# Patient Record
Sex: FEMALE | Race: WHITE | NOT HISPANIC OR LATINO
[De-identification: names, ages, dates, MRNs, and addresses within clinical notes are randomized per-mention and may not be internally consistent; named-entity substitution may affect disease eponyms.]

---

## 2018-02-05 ENCOUNTER — RECORD ABSTRACTING (OUTPATIENT)
Age: 58
End: 2018-02-05

## 2018-02-05 DIAGNOSIS — E03.9 HYPOTHYROIDISM, UNSPECIFIED: ICD-10-CM

## 2018-02-05 DIAGNOSIS — Z87.19 PERSONAL HISTORY OF OTHER DISEASES OF THE DIGESTIVE SYSTEM: ICD-10-CM

## 2018-02-05 DIAGNOSIS — Z82.49 FAMILY HISTORY OF ISCHEMIC HEART DISEASE AND OTHER DISEASES OF THE CIRCULATORY SYSTEM: ICD-10-CM

## 2018-02-05 DIAGNOSIS — R93.1 ABNORMAL FINDINGS ON DIAGNOSTIC IMAGING OF HEART AND CORONARY CIRCULATION: ICD-10-CM

## 2018-02-05 DIAGNOSIS — Z78.9 OTHER SPECIFIED HEALTH STATUS: ICD-10-CM

## 2018-02-05 DIAGNOSIS — Z86.79 PERSONAL HISTORY OF OTHER DISEASES OF THE CIRCULATORY SYSTEM: ICD-10-CM

## 2018-02-05 DIAGNOSIS — Z86.19 PERSONAL HISTORY OF OTHER INFECTIOUS AND PARASITIC DISEASES: ICD-10-CM

## 2018-03-08 ENCOUNTER — APPOINTMENT (OUTPATIENT)
Dept: CARDIOLOGY | Facility: CLINIC | Age: 58
End: 2018-03-08

## 2018-04-25 RX ORDER — BACILLUS COAGULANS/INULIN 1B-250 MG
CAPSULE ORAL
Refills: 0 | Status: ACTIVE | COMMUNITY

## 2018-04-25 RX ORDER — LEVOTHYROXINE SODIUM 137 UG/1
TABLET ORAL
Refills: 0 | Status: DISCONTINUED | COMMUNITY
End: 2018-04-25

## 2018-04-27 ENCOUNTER — APPOINTMENT (OUTPATIENT)
Dept: CARDIOLOGY | Facility: CLINIC | Age: 58
End: 2018-04-27
Payer: COMMERCIAL

## 2018-04-27 VITALS
RESPIRATION RATE: 16 BRPM | HEART RATE: 79 BPM | HEIGHT: 64 IN | DIASTOLIC BLOOD PRESSURE: 80 MMHG | SYSTOLIC BLOOD PRESSURE: 135 MMHG

## 2018-04-27 PROCEDURE — 99214 OFFICE O/P EST MOD 30 MIN: CPT

## 2018-04-27 PROCEDURE — 93000 ELECTROCARDIOGRAM COMPLETE: CPT

## 2018-04-27 RX ORDER — PITAVASTATIN CALCIUM 4.18 MG/1
4 TABLET, FILM COATED ORAL
Refills: 0 | Status: DISCONTINUED | COMMUNITY
End: 2018-04-27

## 2018-04-27 RX ORDER — UBIDECARENONE 50 MG
CAPSULE ORAL
Refills: 0 | Status: DISCONTINUED | COMMUNITY
End: 2018-04-27

## 2018-04-27 RX ORDER — PITAVASTATIN CALCIUM 2.09 MG/1
2 TABLET, FILM COATED ORAL
Qty: 90 | Refills: 0 | Status: DISCONTINUED | COMMUNITY
End: 2018-04-27

## 2018-05-15 ENCOUNTER — RX RENEWAL (OUTPATIENT)
Age: 58
End: 2018-05-15

## 2018-06-08 ENCOUNTER — APPOINTMENT (OUTPATIENT)
Dept: CARDIOLOGY | Facility: CLINIC | Age: 58
End: 2018-06-08
Payer: COMMERCIAL

## 2018-06-08 PROCEDURE — 93880 EXTRACRANIAL BILAT STUDY: CPT

## 2018-06-08 PROCEDURE — 93306 TTE W/DOPPLER COMPLETE: CPT

## 2018-06-15 ENCOUNTER — APPOINTMENT (OUTPATIENT)
Dept: OBGYN | Facility: CLINIC | Age: 58
End: 2018-06-15

## 2018-07-27 ENCOUNTER — APPOINTMENT (OUTPATIENT)
Dept: CARDIOLOGY | Facility: CLINIC | Age: 58
End: 2018-07-27
Payer: COMMERCIAL

## 2018-07-27 PROCEDURE — 93015 CV STRESS TEST SUPVJ I&R: CPT

## 2018-07-27 PROCEDURE — 78452 HT MUSCLE IMAGE SPECT MULT: CPT

## 2018-07-27 PROCEDURE — A9500: CPT

## 2018-08-03 RX ORDER — KIT FOR THE PREPARATION OF TECHNETIUM TC99M SESTAMIBI 1 MG/5ML
INJECTION, POWDER, LYOPHILIZED, FOR SOLUTION PARENTERAL
Refills: 0 | Status: COMPLETED | OUTPATIENT
Start: 2018-08-03

## 2018-08-03 RX ADMIN — KIT FOR THE PREPARATION OF TECHNETIUM TC99M SESTAMIBI 0: 1 INJECTION, POWDER, LYOPHILIZED, FOR SOLUTION PARENTERAL at 00:00

## 2018-08-09 RX ORDER — ALPRAZOLAM 0.5 MG/1
0.5 TABLET ORAL
Qty: 60 | Refills: 0 | Status: ACTIVE | COMMUNITY
Start: 2018-02-08

## 2018-08-16 ENCOUNTER — APPOINTMENT (OUTPATIENT)
Dept: CARDIOLOGY | Facility: CLINIC | Age: 58
End: 2018-08-16
Payer: COMMERCIAL

## 2018-08-16 VITALS
HEART RATE: 80 BPM | DIASTOLIC BLOOD PRESSURE: 85 MMHG | HEIGHT: 64 IN | RESPIRATION RATE: 16 BRPM | SYSTOLIC BLOOD PRESSURE: 120 MMHG

## 2018-08-16 PROCEDURE — 99214 OFFICE O/P EST MOD 30 MIN: CPT

## 2018-08-16 PROCEDURE — 93000 ELECTROCARDIOGRAM COMPLETE: CPT

## 2018-08-16 RX ORDER — CALCIUM CARBONATE/VITAMIN D3 600 MG-10
TABLET ORAL
Refills: 0 | Status: DISCONTINUED | COMMUNITY
End: 2018-08-16

## 2018-10-11 ENCOUNTER — MEDICATION RENEWAL (OUTPATIENT)
Age: 58
End: 2018-10-11

## 2018-11-30 ENCOUNTER — APPOINTMENT (OUTPATIENT)
Dept: OBGYN | Facility: CLINIC | Age: 58
End: 2018-11-30
Payer: COMMERCIAL

## 2018-11-30 VITALS — HEIGHT: 64 IN | DIASTOLIC BLOOD PRESSURE: 68 MMHG | HEART RATE: 82 BPM | SYSTOLIC BLOOD PRESSURE: 126 MMHG

## 2018-11-30 LAB
BILIRUB UR QL STRIP: NEGATIVE
CLARITY UR: CLEAR
COLLECTION METHOD: NORMAL
GLUCOSE UR-MCNC: NEGATIVE
HCG UR QL: 0.2 EU/DL
HGB UR QL STRIP.AUTO: NEGATIVE
KETONES UR-MCNC: NEGATIVE
LEUKOCYTE ESTERASE UR QL STRIP: NEGATIVE
NITRITE UR QL STRIP: NEGATIVE
PH UR STRIP: 7
PROT UR STRIP-MCNC: NEGATIVE
SP GR UR STRIP: 1.01

## 2018-11-30 PROCEDURE — 99386 PREV VISIT NEW AGE 40-64: CPT

## 2018-11-30 PROCEDURE — 81002 URINALYSIS NONAUTO W/O SCOPE: CPT

## 2018-11-30 RX ORDER — TRIAMCINOLONE ACETONIDE 1 MG/G
0.1 CREAM TOPICAL
Qty: 80 | Refills: 0 | Status: DISCONTINUED | COMMUNITY
Start: 2018-03-15 | End: 2018-11-30

## 2018-12-03 LAB — HPV HIGH+LOW RISK DNA PNL CVX: NOT DETECTED

## 2018-12-06 LAB — CYTOLOGY CVX/VAG DOC THIN PREP: NORMAL

## 2018-12-14 ENCOUNTER — APPOINTMENT (OUTPATIENT)
Dept: PULMONOLOGY | Facility: CLINIC | Age: 58
End: 2018-12-14

## 2019-01-21 ENCOUNTER — CLINICAL ADVICE (OUTPATIENT)
Age: 59
End: 2019-01-21

## 2019-05-03 ENCOUNTER — NON-APPOINTMENT (OUTPATIENT)
Age: 59
End: 2019-05-03

## 2019-05-03 ENCOUNTER — APPOINTMENT (OUTPATIENT)
Dept: CARDIOLOGY | Facility: CLINIC | Age: 59
End: 2019-05-03
Payer: COMMERCIAL

## 2019-05-03 VITALS
RESPIRATION RATE: 16 BRPM | BODY MASS INDEX: 21.34 KG/M2 | HEART RATE: 84 BPM | DIASTOLIC BLOOD PRESSURE: 60 MMHG | HEIGHT: 64 IN | SYSTOLIC BLOOD PRESSURE: 118 MMHG | WEIGHT: 125 LBS

## 2019-05-03 PROCEDURE — 93000 ELECTROCARDIOGRAM COMPLETE: CPT

## 2019-05-03 PROCEDURE — 99214 OFFICE O/P EST MOD 30 MIN: CPT

## 2019-05-03 NOTE — PHYSICAL EXAM
[FreeTextEntry1] :                    Well appearing and nourished with no obvious deformities or distress.\par \par Eyes: \par No conjunctival injection and no xanthelasmas.\par HEENT: \par Normocephalic.Normal oral mucosa. No pallor or cyanosis\par Neck: \par No jugular venous distension. with normal A and V wave forms. No palpable adenopathy.\par Cardiovascular: \par Normal rate and rhythm with normal S1, S2 and a grade 1/6 systolic murmur. Distal arterial pulses are normal. No significant peripheral edema.\par Pulmonary: \par Lungs are clear to auscultation and percussion. Normal respiratory pattern without any accessory muscle use\par Abdomen: \par Soft, non-tender ; no palpable organomegaly or masses.\par Extremities:\par No digital clubbing, cyanosis or ischemic changes.\par Skin: \par No skin lesions, rashes, ulcers or xanthomas.\par Psychiatric: \par Alert and oriented to person, place and time. Appropriate mood but anxious affect.

## 2019-05-03 NOTE — REASON FOR VISIT
[FreeTextEntry1] : Patient returns here for followup with regard to management of problems which include:\par \par 1. History of atherosclerotic coronary disease\par \par 2. Hyperlipidemia\par \par 3. Fatigability and muscle pain presumptively from chronic lyme disease.\par \par 4. Very strong family history of premature CAD\par \par 5. Chronic pericardial effusion\par

## 2019-05-03 NOTE — REVIEW OF SYSTEMS
[Headache] : headache [Feeling Fatigued] : feeling fatigued [Recent Weight Loss (___ Lbs)] : no recent weight loss [Recent Weight Gain (___ Lbs)] : no recent weight gain [Heartburn] : heartburn [Abdominal Pain] : abdominal pain [Joint Pain] : joint pain [Joint Stiffness] : joint stiffness [Negative] : Endocrine

## 2019-05-03 NOTE — ASSESSMENT
[FreeTextEntry1] : ECG normal sinus rhythm at 80. He T wave abnormalities anteriorly but unchanged from prior.\par \par                                                                                              ( On Simvastatin 40 5 x week)\par                              January 12, 2018                     8/3/18       4/12/19\par Total cholesterol             152                                     173         154\par HDL                                 56                                        89           59\par LDL                                 72   on Livalo  4 mg             61            78\par  daily (down from 106 on Livalo 2 mg daily)\par \par .\par \par Echocardiogram 6/8/18:\par Normal LV size and function. LVEF 60%.\par Mild mitral valve polyps.\par Trivial pericardial effusion.\par Unchanged from prior study December 2016.\par \par 6/8/18:\par No significant atherosclerotic disease seen bilaterally.\par \par Impression:\par 1. Coronary artery disease as outlined above on the CTA. involving prox LAD\par 2. Stress testing  7/27/18 showing no ischemia and preserved functional capacity\par 3. Marked improvement in lipids with Livalo however, on Simvastatin she is over goal , but taking  5 x week\par 4. No sig carotid disease\par 5. Trivial chronic pericardial effusion\par \par \par Plan:\par 1. Will adjust simvastatin 40 mg daily\par 2. We'll obtain blood work in 3 months.\par 3. Repeat echo in July\par 4. Repeat nuclear stress in august\par F/U 6 months

## 2019-08-01 ENCOUNTER — APPOINTMENT (OUTPATIENT)
Dept: CARDIOLOGY | Facility: CLINIC | Age: 59
End: 2019-08-01
Payer: COMMERCIAL

## 2019-08-01 PROCEDURE — 93306 TTE W/DOPPLER COMPLETE: CPT

## 2019-08-23 ENCOUNTER — APPOINTMENT (OUTPATIENT)
Dept: CARDIOLOGY | Facility: CLINIC | Age: 59
End: 2019-08-23
Payer: COMMERCIAL

## 2019-08-23 PROCEDURE — 78452 HT MUSCLE IMAGE SPECT MULT: CPT

## 2019-08-23 PROCEDURE — 93015 CV STRESS TEST SUPVJ I&R: CPT

## 2019-08-23 PROCEDURE — A9500: CPT

## 2019-09-05 RX ORDER — KIT FOR THE PREPARATION OF TECHNETIUM TC99M SESTAMIBI 1 MG/5ML
INJECTION, POWDER, LYOPHILIZED, FOR SOLUTION PARENTERAL
Refills: 0 | Status: COMPLETED | OUTPATIENT
Start: 2019-09-05

## 2019-09-05 RX ADMIN — KIT FOR THE PREPARATION OF TECHNETIUM TC99M SESTAMIBI 0: 1 INJECTION, POWDER, LYOPHILIZED, FOR SOLUTION PARENTERAL at 00:00

## 2019-09-16 ENCOUNTER — RX RENEWAL (OUTPATIENT)
Age: 59
End: 2019-09-16

## 2019-11-05 ENCOUNTER — RX RENEWAL (OUTPATIENT)
Age: 59
End: 2019-11-05

## 2019-12-10 ENCOUNTER — MEDICATION RENEWAL (OUTPATIENT)
Age: 59
End: 2019-12-10

## 2019-12-19 ENCOUNTER — APPOINTMENT (OUTPATIENT)
Dept: GASTROENTEROLOGY | Facility: CLINIC | Age: 59
End: 2019-12-19

## 2020-05-28 ENCOUNTER — TRANSCRIPTION ENCOUNTER (OUTPATIENT)
Age: 60
End: 2020-05-28

## 2020-07-06 ENCOUNTER — APPOINTMENT (OUTPATIENT)
Dept: CARDIOLOGY | Facility: CLINIC | Age: 60
End: 2020-07-06

## 2020-08-24 ENCOUNTER — APPOINTMENT (OUTPATIENT)
Dept: ANTEPARTUM | Facility: CLINIC | Age: 60
End: 2020-08-24

## 2020-08-24 ENCOUNTER — APPOINTMENT (OUTPATIENT)
Dept: OBGYN | Facility: CLINIC | Age: 60
End: 2020-08-24
Payer: COMMERCIAL

## 2020-08-24 VITALS
HEIGHT: 64 IN | BODY MASS INDEX: 20.66 KG/M2 | DIASTOLIC BLOOD PRESSURE: 80 MMHG | WEIGHT: 121 LBS | SYSTOLIC BLOOD PRESSURE: 130 MMHG | HEART RATE: 83 BPM

## 2020-08-24 DIAGNOSIS — Z01.419 ENCOUNTER FOR GYNECOLOGICAL EXAMINATION (GENERAL) (ROUTINE) W/OUT ABNORMAL FINDINGS: ICD-10-CM

## 2020-08-24 LAB
BLOOD URINE: NORMAL
GLUCOSE QUALITATIVE U: NORMAL
KETONES URINE: NORMAL
LEUKOCYTE ESTERASE URINE: NORMAL
NITRITE URINE: NORMAL
PROTEIN URINE: NORMAL

## 2020-08-24 PROCEDURE — 99396 PREV VISIT EST AGE 40-64: CPT

## 2020-08-24 RX ORDER — AZITHROMYCIN 500 MG/1
500 TABLET, FILM COATED ORAL
Qty: 30 | Refills: 0 | Status: COMPLETED | COMMUNITY
Start: 2020-03-27

## 2020-08-24 RX ORDER — LEVOTHYROXINE SODIUM 50 UG/1
50 TABLET ORAL
Refills: 0 | Status: DISCONTINUED | COMMUNITY
End: 2020-08-24

## 2020-08-24 RX ORDER — DOXYCYCLINE HYCLATE 100 MG/1
100 TABLET ORAL
Qty: 90 | Refills: 0 | Status: COMPLETED | COMMUNITY
Start: 2020-03-27

## 2020-08-24 RX ORDER — LEVOTHYROXINE SODIUM 75 UG/1
75 TABLET ORAL
Refills: 0 | Status: DISCONTINUED | COMMUNITY
End: 2020-08-24

## 2020-08-24 RX ORDER — NYSTATIN 500000 [USP'U]/1
500000 TABLET, FILM COATED ORAL
Qty: 60 | Refills: 0 | Status: COMPLETED | COMMUNITY
Start: 2020-02-27

## 2020-08-24 RX ORDER — ARTEMETHER AND LUMEFANTRINE 20; 120 MG/1; MG/1
20-120 TABLET ORAL
Qty: 24 | Refills: 0 | Status: COMPLETED | COMMUNITY
Start: 2020-02-27

## 2020-08-24 NOTE — HISTORY OF PRESENT ILLNESS
[___ Year(s) Ago] : [unfilled] year(s) ago [Good] : being in good health [Healthy Diet] : a healthy diet [Regular Exercise] : regular exercise [Current] : risk screening reviewed and current [Postmenopausal] : is postmenopausal [Pregnancy History] : pregnancy history: [HPV Vaccine NA Due to Age] : HPV vaccine not available to patient due to age [Currently In Menopause] : currently in menopause [Menopause Age: ____] : age at menopause was [unfilled] [Monogamous] : is monogamous [Sexually Active] : is sexually active [Male ___] : [unfilled] male [NA] : N/A [Weight Concerns] : no concerns with her weight [Menstrual Problems] : reports normal menses [Up to Date] : not up to date with ~his/her~ STD screening [Nausea] : no nausea [Fever] : no fever [Vomiting] : no vomiting [Diarrhea] : no diarrhea [Vaginal Bleeding] : no vaginal bleeding [Pelvic Pressure] : no pelvic pressure [Dysuria] : no dysuria [FreeTextEntry9] : Post menopausal. [Localized Pain] : no localized pain [Mass (___cm)] : no palpable mass [Diffused Pain] : no diffused pain [Nipple Discharge] : no nipple discharge [Skin Color Change] : no skin color change [Hot Flashes] : no hot flashes [Night Sweats] : no night sweats [Vaginal Itching] : no vaginal itching [Dyspareunia] : no dyspareunia [Mood Changes] : no mood changes [Normal Amount/Duration] : was abnormal [Spotting Between  Menses] : no spotting between menses [On BCP at conception] : the patient was not on BCP at conception [Menstrual Cramps] : no menstrual cramps [Experiencing Menopausal Sxs] : not experiencing menopausal symptoms [Contraception] : does not use contraception

## 2020-08-24 NOTE — PHYSICAL EXAM
[Awake] : awake [Alert] : alert [Well Developed] : well developed [Well Nourished] : well nourished [Normal Appearance] : was normal in appearance [Neck Supple] : was supple [Examination Of The Breasts] : a normal appearance [Breast Palpation Diffuse Fibrous Tissue Bilateral] : fibrocystic changes [No Masses] : no breast masses were palpable [Soft] : soft [Soft, Nontender] : the abdomen was soft and nontender [No Mass] : no masses were palpated [Oriented x3] : oriented to person, place, and time [No HSM] : no hepatosplenomegaly noted [Normal] : cervix [No Bleeding] : there was no active vaginal bleeding [Uterine Adnexae] : were not tender and not enlarged [Normal S1] : normal S1 [Normal S2] : normal S2 [Normal Rate] : normal [No Murmur] : no murmurs heard [No Pitting Edema] : no pitting edema present [Arterial Pulses Equal At ___ Bilat (/N Is Sub: Default = /2)] : the peripheral pulses were 2+ and symmetric [Normal Carotids] : the carotid pulses were normal with no bruits [Enlarged Diffusely] : was not enlarged [Acute Distress] : no acute distress [Mass] : no breast mass [Nipple Discharge] : no nipple discharge [Axillary LAD] : no axillary lymphadenopathy [Tender] : non tender [S3] : no S3 [S4] : no S4

## 2020-08-24 NOTE — REVIEW OF SYSTEMS
[Sight Problems] : sight problems [Nl] : Hematologic/Lymphatic [FreeTextEntry1] : Chronic Lyme disease.

## 2020-08-25 NOTE — REVIEW OF SYSTEMS
[Headache] : headache [Recent Weight Gain (___ Lbs)] : no recent weight gain [Feeling Fatigued] : feeling fatigued [Recent Weight Loss (___ Lbs)] : no recent weight loss [Abdominal Pain] : abdominal pain [Heartburn] : heartburn [Joint Pain] : joint pain [Joint Stiffness] : joint stiffness [Negative] : Heme/Lymph

## 2020-08-25 NOTE — HISTORY OF PRESENT ILLNESS
[FreeTextEntry1] : Patient admits to being very anxious about her underlying medical issues. \par Much of this relates to her history of chronic Lyme disease.\par \par She had been on Livalo which was very effective in lowering her LDL, however, \par we stopped it bec of the monthly costs and switched to Simvastatin 40 mg which we switched from QOD tp 5 x weekly last visit\par \par Cardiac CTA June 2, 2017\par Heavy calcification and plaquing of the proximal LAD 50-70%\par 10-25% diagonal and obtuse marginal branch\par 20-40% RCA\par \par 7/27/18 nuclear stress test\par Exercise 10 minutes 30 seconds mora 12 Mets).\par Peak heart rate 146 (91% maximum).\par Blood pressure response normal\par No ischemic ECG changes.\par Normal myocardial perfusion

## 2020-08-26 ENCOUNTER — APPOINTMENT (OUTPATIENT)
Dept: CARDIOLOGY | Facility: CLINIC | Age: 60
End: 2020-08-26

## 2020-08-27 LAB — HPV HIGH+LOW RISK DNA PNL CVX: NOT DETECTED

## 2020-08-31 LAB — CYTOLOGY CVX/VAG DOC THIN PREP: NORMAL

## 2020-09-09 ENCOUNTER — APPOINTMENT (OUTPATIENT)
Dept: CARDIOLOGY | Facility: CLINIC | Age: 60
End: 2020-09-09
Payer: COMMERCIAL

## 2020-09-09 VITALS
TEMPERATURE: 98 F | HEIGHT: 64 IN | WEIGHT: 119 LBS | BODY MASS INDEX: 20.32 KG/M2 | SYSTOLIC BLOOD PRESSURE: 125 MMHG | OXYGEN SATURATION: 97 % | HEART RATE: 82 BPM | RESPIRATION RATE: 16 BRPM | DIASTOLIC BLOOD PRESSURE: 80 MMHG

## 2020-09-09 PROCEDURE — 99214 OFFICE O/P EST MOD 30 MIN: CPT

## 2020-09-09 PROCEDURE — 93000 ELECTROCARDIOGRAM COMPLETE: CPT

## 2020-09-09 NOTE — REVIEW OF SYSTEMS
[Headache] : headache [Feeling Fatigued] : feeling fatigued [Heartburn] : heartburn [Abdominal Pain] : abdominal pain [Joint Stiffness] : joint stiffness [Joint Pain] : joint pain [Negative] : Heme/Lymph [Recent Weight Gain (___ Lbs)] : no recent weight gain [Recent Weight Loss (___ Lbs)] : no recent weight loss

## 2020-09-09 NOTE — ASSESSMENT
[FreeTextEntry1] :                                                                                              Sim 40 5x/wk     Daily\par                              January 12, 2018                     8/3/18       4/12/19             8/25/20\par Total cholesterol             152                                     173         154                     133\par HDL                                 56                                        89           59                       60\par LDL                                 72   on Livalo  4 mg             61            78                      46\par  daily (down from 106 on Livalo 2 mg daily)\par \par Echocardiogram 8/1/19: \par LVEF 60%.\par No valvular heart disease of significance\par Trivial pericardial effusion unchanged.\par \par Exercise sestamibi stress test 8/23/19:\par 12 minutes of exercise\par Heart rate one 5195%\par No ischemic ECG changes and normal sestamibi imaging.\par \par Echocardiogram 6/8/18:\par Normal LV size and function. LVEF 60%.\par Mild mitral valve polyps.\par Trivial pericardial effusion.\par Unchanged from prior study December 2016.\par \par 6/8/18:Carotid duplex\par No significant atherosclerotic disease seen bilaterally.\par \par Impression:\par 1. Coronary artery disease as outlined above on the CTA. involving prox LAD\par 2. Stress testing  2019  showing no ischemia at a good work load and preserved functional capacity\par 3. Marked improvement in lipids with Simvastatin daily\par 4. No sig carotid disease\par 5. Trivial chronic pericardial effusion unchanged \par \par \par Plan:\par 1. Will continue simvastatin 40 mg daily\par 2. We'll obtain blood work in 6 months.\par 3. Repeat echo this Fall\par 4. Repeat nuclear stress next summer\par F/U 6 months

## 2020-09-09 NOTE — HISTORY OF PRESENT ILLNESS
[FreeTextEntry1] : Patient admits to being very anxious about her underlying medical issues. \par Much of this relates to her history of chronic Lyme disease.\par \par She had been on Livalo which was very effective in lowering her LDL, however, \par we stopped it bec of the monthly costs and switched to Simvastatin 40 mg which we switched from QOD tp 5 x weekly \par \par Cardiac CTA June 2, 2017\par Heavy calcification and plaquing of the proximal LAD 50-70%\par 10-25% diagonal and obtuse marginal branch\par 20-40% RCA\par \par 7/27/18 nuclear stress test\par Exercise 10 minutes 30 seconds mora 12 Mets).\par Peak heart rate 146 (91% maximum).\par Blood pressure response normal\par No ischemic ECG changes.\par Normal myocardial perfusion

## 2020-09-16 ENCOUNTER — APPOINTMENT (OUTPATIENT)
Dept: CARDIOLOGY | Facility: CLINIC | Age: 60
End: 2020-09-16

## 2020-10-26 ENCOUNTER — NON-APPOINTMENT (OUTPATIENT)
Age: 60
End: 2020-10-26

## 2020-10-28 ENCOUNTER — NON-APPOINTMENT (OUTPATIENT)
Age: 60
End: 2020-10-28

## 2020-10-29 ENCOUNTER — NON-APPOINTMENT (OUTPATIENT)
Age: 60
End: 2020-10-29

## 2020-10-29 ENCOUNTER — APPOINTMENT (OUTPATIENT)
Dept: CARDIOLOGY | Facility: CLINIC | Age: 60
End: 2020-10-29
Payer: COMMERCIAL

## 2020-10-29 VITALS
DIASTOLIC BLOOD PRESSURE: 62 MMHG | RESPIRATION RATE: 15 BRPM | SYSTOLIC BLOOD PRESSURE: 124 MMHG | OXYGEN SATURATION: 96 %

## 2020-10-29 DIAGNOSIS — R42 DIZZINESS AND GIDDINESS: ICD-10-CM

## 2020-10-29 DIAGNOSIS — R10.13 EPIGASTRIC PAIN: ICD-10-CM

## 2020-10-29 DIAGNOSIS — M54.89 OTHER DORSALGIA: ICD-10-CM

## 2020-10-29 PROCEDURE — 93000 ELECTROCARDIOGRAM COMPLETE: CPT

## 2020-10-29 PROCEDURE — 99072 ADDL SUPL MATRL&STAF TM PHE: CPT

## 2020-11-17 ENCOUNTER — APPOINTMENT (OUTPATIENT)
Dept: CARDIOLOGY | Facility: CLINIC | Age: 60
End: 2020-11-17

## 2020-11-18 ENCOUNTER — APPOINTMENT (OUTPATIENT)
Dept: CARDIOLOGY | Facility: CLINIC | Age: 60
End: 2020-11-18
Payer: COMMERCIAL

## 2020-11-18 PROCEDURE — 93306 TTE W/DOPPLER COMPLETE: CPT

## 2020-11-19 ENCOUNTER — APPOINTMENT (OUTPATIENT)
Dept: CARDIOLOGY | Facility: CLINIC | Age: 60
End: 2020-11-19
Payer: COMMERCIAL

## 2020-11-19 PROCEDURE — 78452 HT MUSCLE IMAGE SPECT MULT: CPT

## 2020-11-19 PROCEDURE — 93015 CV STRESS TEST SUPVJ I&R: CPT

## 2020-11-19 PROCEDURE — A9500: CPT

## 2020-11-19 RX ADMIN — AMINOPHYLLINE 3 MG/ML: 25 INJECTION, SOLUTION INTRAVENOUS at 00:00

## 2020-11-19 RX ADMIN — REGADENOSON 5 MG/5ML: 0.08 INJECTION, SOLUTION INTRAVENOUS at 00:00

## 2020-11-24 ENCOUNTER — NON-APPOINTMENT (OUTPATIENT)
Age: 60
End: 2020-11-24

## 2020-12-01 RX ORDER — AMINOPHYLLINE 25 MG/ML
25 INJECTION, SOLUTION INTRAVENOUS
Qty: 0 | Refills: 0 | Status: COMPLETED | OUTPATIENT
Start: 2020-11-19

## 2020-12-01 RX ORDER — REGADENOSON 0.08 MG/ML
0.4 INJECTION, SOLUTION INTRAVENOUS
Qty: 4 | Refills: 0 | Status: COMPLETED | OUTPATIENT
Start: 2020-11-19

## 2020-12-02 ENCOUNTER — APPOINTMENT (OUTPATIENT)
Dept: CARDIOLOGY | Facility: CLINIC | Age: 60
End: 2020-12-02

## 2020-12-10 ENCOUNTER — OUTPATIENT (OUTPATIENT)
Dept: OUTPATIENT SERVICES | Facility: HOSPITAL | Age: 60
LOS: 1 days | End: 2020-12-10
Payer: COMMERCIAL

## 2020-12-10 DIAGNOSIS — R12 HEARTBURN: ICD-10-CM

## 2020-12-10 DIAGNOSIS — R13.12 DYSPHAGIA, OROPHARYNGEAL PHASE: ICD-10-CM

## 2020-12-10 PROCEDURE — 74240 X-RAY XM UPR GI TRC 1CNTRST: CPT | Mod: 26

## 2020-12-23 PROBLEM — Z01.419 ENCOUNTER FOR ANNUAL ROUTINE GYNECOLOGICAL EXAMINATION: Status: RESOLVED | Noted: 2020-08-24 | Resolved: 2020-12-23

## 2020-12-30 ENCOUNTER — APPOINTMENT (OUTPATIENT)
Dept: CARDIOLOGY | Facility: CLINIC | Age: 60
End: 2020-12-30

## 2021-01-04 RX ORDER — KIT FOR THE PREPARATION OF TECHNETIUM TC99M SESTAMIBI 1 MG/5ML
INJECTION, POWDER, LYOPHILIZED, FOR SOLUTION PARENTERAL
Refills: 0 | Status: COMPLETED | OUTPATIENT
Start: 2021-01-04

## 2021-01-04 RX ADMIN — KIT FOR THE PREPARATION OF TECHNETIUM TC99M SESTAMIBI 0: 1 INJECTION, POWDER, LYOPHILIZED, FOR SOLUTION PARENTERAL at 00:00

## 2021-10-22 ENCOUNTER — RX RENEWAL (OUTPATIENT)
Age: 61
End: 2021-10-22

## 2021-10-27 ENCOUNTER — APPOINTMENT (OUTPATIENT)
Dept: CARDIOLOGY | Facility: CLINIC | Age: 61
End: 2021-10-27
Payer: COMMERCIAL

## 2021-10-27 PROCEDURE — 93306 TTE W/DOPPLER COMPLETE: CPT

## 2021-11-22 ENCOUNTER — APPOINTMENT (OUTPATIENT)
Dept: CARDIOLOGY | Facility: CLINIC | Age: 61
End: 2021-11-22
Payer: COMMERCIAL

## 2021-11-22 VITALS
DIASTOLIC BLOOD PRESSURE: 62 MMHG | WEIGHT: 120 LBS | OXYGEN SATURATION: 97 % | HEIGHT: 64 IN | RESPIRATION RATE: 16 BRPM | HEART RATE: 73 BPM | SYSTOLIC BLOOD PRESSURE: 138 MMHG | BODY MASS INDEX: 20.49 KG/M2

## 2021-11-22 DIAGNOSIS — R07.89 OTHER CHEST PAIN: ICD-10-CM

## 2021-11-22 PROCEDURE — 93000 ELECTROCARDIOGRAM COMPLETE: CPT

## 2021-11-22 PROCEDURE — 99214 OFFICE O/P EST MOD 30 MIN: CPT

## 2021-11-22 RX ORDER — UBIDECARENONE 100 MG
CAPSULE ORAL
Refills: 0 | Status: DISCONTINUED | COMMUNITY
End: 2021-11-22

## 2021-11-22 NOTE — ASSESSMENT
[FreeTextEntry1] : ECG: Normal sinus rhythm at 73.  Nonspecific T wave normalities.    Unchanged from baseline.\par \par                                                                                          Sim 40 5x/wk     Daily\par                              January 12, 2018                     8/3/18       4/12/19             8/25/20----6/17/21--10/20/21\par Total cholesterol             152                                     173         154                     133------160-------154\par HDL                                 56                                        89           59                       60-------64---------60\par LDL                                 72   on Livalo  4 mg             61            78                      46-------80---------75\par  daily (down from 106 on Livalo 2 mg daily)\par \par Echocardiogram 10/27/2021:\par Normal LV size and function ejection fraction 60%\par Small circumferential pericardial effusion minimally changed in comparison to the prior study\par \par Echocardiogram 8/1/19: \par LVEF 60%.\par No valvular heart disease of significance\par Trivial pericardial effusion unchanged.\par \par Pharmacologic sestamibi 11/19/20:\par Normal sestamibi imaging and no evidence of ischemia.\par Unchanged from study of 2019\par \par Exercise sestamibi stress test 8/23/19:\par 12 minutes of exercise\par Heart rate one 5195%\par No ischemic ECG changes and normal sestamibi imaging.\par \par Echocardiogram 6/8/18:\par Normal LV size and function. LVEF 60%.\par Mild mitral valve polyps.\par Trivial pericardial effusion.\par Unchanged from prior study December 2016.\par \par 6/8/18:Carotid duplex\par No significant atherosclerotic disease seen bilaterally.\par \par Impression:\par 1. Coronary artery disease as outlined above on the CTA 2017. involving prox LAD\par 2. Stress testing  2020  showing no ischemia \par 3. HLD: Stable reasonably good control.\par 4. No sig carotid disease\par 5.  Idiopathic pericardial effusion trivial to small with minimal interval change.\par 6.  Left lower extremity pain less likely statin related\par \par Plan:\par 1. Will continue simvastatin 40 mg daily, though I suggested stopping for 4 weeks and seeing if her lower      extremity pain improved\par 2. blood work in 6 months.  Through PMD\par 3. Repeat echo 1 year\par 4. Repeat nuclear stress 1 year\par 5.  Maintain regular exercise regimen\par 6.  Contact me with regard to any new exertional shortness of breath or chest discomfort\par F/U 6 months

## 2021-11-22 NOTE — HISTORY OF PRESENT ILLNESS
[FreeTextEntry1] : Patient admits to being very anxious about her underlying medical issues. \par Much of this relates to her history of chronic Lyme disease.\par \par She had been on Livalo which was very effective in lowering her LDL, however, \par we stopped it bec of the monthly costs and switched to Simvastatin 40 mg which we switched from QOD tp 5 x weekly \par \par Cardiac CTA June 2, 2017\par Heavy calcification and plaquing of the proximal LAD 50-70%\par 10-25% diagonal and obtuse marginal branch\par 20-40% RCA\par \par 7/27/18 nuclear stress test\par Exercise 10 minutes 30 seconds mora 12 Mets).\par Peak heart rate 146 (91% maximum).\par Blood pressure response normal\par No ischemic ECG changes.\par Normal myocardial perfusion\par \par Other complaints include intrascapular discomfort that is nonexertional\par Complains of left buttock and thigh pain more so on the left than the right.\par \par No longer exercising regularly though does go for walks.

## 2021-12-31 ENCOUNTER — TRANSCRIPTION ENCOUNTER (OUTPATIENT)
Age: 61
End: 2021-12-31

## 2022-08-31 ENCOUNTER — NON-APPOINTMENT (OUTPATIENT)
Age: 62
End: 2022-08-31

## 2022-09-19 VITALS
BODY MASS INDEX: 21 KG/M2 | SYSTOLIC BLOOD PRESSURE: 120 MMHG | DIASTOLIC BLOOD PRESSURE: 80 MMHG | WEIGHT: 123 LBS | HEART RATE: 77 BPM | HEIGHT: 64 IN

## 2022-09-19 DIAGNOSIS — Z01.419 ENCOUNTER FOR GYNECOLOGICAL EXAMINATION (GENERAL) (ROUTINE) W/OUT ABNORMAL FINDINGS: ICD-10-CM

## 2022-09-19 PROCEDURE — 99396 PREV VISIT EST AGE 40-64: CPT

## 2022-09-19 RX ORDER — COVID-19 MOLECULAR TEST ASSAY
KIT MISCELLANEOUS
Qty: 1 | Refills: 0 | Status: COMPLETED | COMMUNITY
Start: 2022-08-11

## 2022-09-19 RX ORDER — TRETINOIN 0.25 MG/G
0.03 CREAM TOPICAL
Qty: 20 | Refills: 0 | Status: COMPLETED | COMMUNITY
Start: 2022-08-11

## 2022-09-19 RX ORDER — CIPROFLOXACIN HYDROCHLORIDE 500 MG/1
500 TABLET, FILM COATED ORAL
Qty: 20 | Refills: 0 | Status: COMPLETED | COMMUNITY
Start: 2022-07-02

## 2022-09-19 RX ORDER — COVID-19 ANTIGEN TEST
KIT MISCELLANEOUS
Qty: 2 | Refills: 0 | Status: COMPLETED | COMMUNITY
Start: 2022-08-05

## 2022-09-19 RX ORDER — CHROMIUM 200 MCG
TABLET ORAL
Refills: 0 | Status: ACTIVE | COMMUNITY

## 2022-09-19 RX ORDER — THYROID, PORCINE 90 MG/1
TABLET ORAL DAILY
Refills: 0 | Status: DISCONTINUED | COMMUNITY
End: 2022-09-19

## 2022-09-19 RX ORDER — METRONIDAZOLE 500 MG/1
500 TABLET ORAL
Qty: 30 | Refills: 0 | Status: COMPLETED | COMMUNITY
Start: 2022-07-03

## 2022-09-19 NOTE — REVIEW OF SYSTEMS
[Eye Discharge] : eye discharge [Negative] : Heme/Lymph [FreeTextEntry5] : ASHD,  Pericardial effusion. [FreeTextEntry7] : diverticulitis. [FreeTextEntry9] : osteoporosis.

## 2022-09-19 NOTE — DISCUSSION/SUMMARY
[FreeTextEntry1] : PAP.\par \par Mammo normal.\par DEXA \par To seen Endo to discuss treatment.\par continue bioidentical HRT.\par All questions answered.\par Routine follow up.\par

## 2022-09-19 NOTE — HISTORY OF PRESENT ILLNESS
[FreeTextEntry1] : routine check up.\par recent diverticulitis.\par has intermittent mild pelvic pain. [Patient reported mammogram was normal] : Patient reported mammogram was normal [Patient reported PAP Smear was normal] : Patient reported PAP Smear was normal [HIV test declined] : HIV test declined [Syphilis test declined] : Syphilis test declined [Gonorrhea test declined] : Gonorrhea test declined [Chlamydia test declined] : Chlamydia test declined [Trichomonas test declined] : Trichomonas test declined [HPV test offered] : HPV test offered [Hepatitis B test declined] : Hepatitis B test declined [Hepatitis C test declined] : Hepatitis C test declined [postmenopausal] : postmenopausal [Y] : Patient is sexually active [N] : Patient denies prior pregnancies [TextBox_4] : normal gyn visit. [Mammogramdate] : 08/22 [PapSmeardate] : 08/20 [Diffuse] : diffuse [Menses] : not menses [Grandfalls] : intercourse [Bowel Movement] : bowel movement [TextBox_10] : diffuse.  mild intermittent. [Diffused Pain] : diffused pain [Intermittent] : intermittent [Mild] : mild [Currently In Menopause] : currently in menopause [Post-Menopause, No Sxs] : post-menopausal, currently without symptoms [Menopause Age: ____] : age at menopause was [unfilled] [Currently Active] : currently active [Men] : men [Vaginal] : vaginal [No] : No [Patient refuses STI testing] : Patient refuses STI testing

## 2022-09-20 LAB — HPV HIGH+LOW RISK DNA PNL CVX: NOT DETECTED

## 2022-09-26 LAB — CYTOLOGY CVX/VAG DOC THIN PREP: ABNORMAL

## 2022-09-30 ENCOUNTER — APPOINTMENT (OUTPATIENT)
Dept: CARDIOLOGY | Facility: CLINIC | Age: 62
End: 2022-09-30

## 2022-09-30 PROCEDURE — 93306 TTE W/DOPPLER COMPLETE: CPT

## 2022-10-19 ENCOUNTER — RESULT REVIEW (OUTPATIENT)
Age: 62
End: 2022-10-19

## 2022-10-20 ENCOUNTER — APPOINTMENT (OUTPATIENT)
Dept: CT IMAGING | Facility: CLINIC | Age: 62
End: 2022-10-20

## 2022-10-20 ENCOUNTER — TRANSCRIPTION ENCOUNTER (OUTPATIENT)
Age: 62
End: 2022-10-20

## 2022-10-20 ENCOUNTER — OUTPATIENT (OUTPATIENT)
Dept: OUTPATIENT SERVICES | Facility: HOSPITAL | Age: 62
LOS: 1 days | End: 2022-10-20
Payer: COMMERCIAL

## 2022-10-20 DIAGNOSIS — Z00.8 ENCOUNTER FOR OTHER GENERAL EXAMINATION: ICD-10-CM

## 2022-10-20 PROCEDURE — 75574 CT ANGIO HRT W/3D IMAGE: CPT | Mod: 26

## 2022-10-20 PROCEDURE — 0503T: CPT

## 2022-10-20 PROCEDURE — 0502T: CPT

## 2022-10-20 PROCEDURE — 75574 CT ANGIO HRT W/3D IMAGE: CPT

## 2022-10-20 PROCEDURE — 0504T: CPT

## 2022-10-24 ENCOUNTER — NON-APPOINTMENT (OUTPATIENT)
Age: 62
End: 2022-10-24

## 2022-10-31 ENCOUNTER — APPOINTMENT (OUTPATIENT)
Dept: CARDIOLOGY | Facility: CLINIC | Age: 62
End: 2022-10-31

## 2022-10-31 VITALS
RESPIRATION RATE: 16 BRPM | DIASTOLIC BLOOD PRESSURE: 72 MMHG | HEART RATE: 81 BPM | SYSTOLIC BLOOD PRESSURE: 120 MMHG | OXYGEN SATURATION: 90 % | HEIGHT: 64 IN

## 2022-10-31 PROCEDURE — 93000 ELECTROCARDIOGRAM COMPLETE: CPT

## 2022-10-31 PROCEDURE — 99214 OFFICE O/P EST MOD 30 MIN: CPT | Mod: 25

## 2022-10-31 RX ORDER — LEVOTHYROXINE SODIUM 50 UG/1
50 TABLET ORAL DAILY
Qty: 90 | Refills: 3 | Status: ACTIVE | COMMUNITY

## 2022-10-31 RX ORDER — LEVOTHYROXINE, LIOTHYRONINE 38; 9 UG/1; UG/1
60 TABLET ORAL
Qty: 90 | Refills: 0 | Status: DISCONTINUED | COMMUNITY
Start: 2022-05-04 | End: 2022-10-31

## 2022-11-01 NOTE — REASON FOR VISIT
[FreeTextEntry1] : Patient returns here for followup with regard to management of problems which include:\par \par 1. History of atherosclerotic coronary disease; abnormal CTA 2017\par \par 2. Hyperlipidemia\par \par 3. Fatigability and muscle pain presumptively from chronic lyme disease.\par \par 4. Very strong family history of premature CAD\par \par 5. Chronic pericardial effusion\par

## 2022-11-01 NOTE — HISTORY OF PRESENT ILLNESS
[FreeTextEntry1] : Patient here to review the results of her recent cardiac CT angiogram and echocardiogram.  \par \par Cardiac CTA June 2, 2017\par Heavy calcification and plaquing of the proximal LAD 50-70%\par 10-25% diagonal and obtuse marginal branch\par 20-40% RCA\par \par Patient admits to being very anxious about her underlying medical issues. \par Much of this relates to her history of chronic Lyme disease.\par \par She had been on Livalo which was very effective in lowering her LDL, however, \par we stopped it bec of the monthly costs and switched to Simvastatin 40 mg which we switched from QOD tp 5 x weekly \par \par 7/27/18 nuclear stress test\par Exercise 10 minutes 30 seconds mora 12 Mets).\par Peak heart rate 146 (91% maximum).\par Blood pressure response normal\par No ischemic ECG changes.\par Normal myocardial perfusion\par \par No longer exercising regularly though does go for walks.

## 2022-11-01 NOTE — ASSESSMENT
[FreeTextEntry1] : ECG: Was refused\par \par -----------------------------------------------------------------Sim 40 5x/wk-- Daily\par ---------1/12/18--- 8/3/18---4/12/19---8/25/20----6/17/21--10/20/21----7/3/22\par Chol------152-------173-------154-------133--------160-------154----------121\par HDL------ 56---------89---------59------- 60---------64---------60-----------48\par LDL--------72--------61---------78--------46---------80---------75-----------48\par  daily (down from 106 on Livalo 2 mg daily)-\par \par Cardiac CTA 10/23/2022:\par Calcium score-856\par Left main: 0\par LAD: 416\par Circumflex: 26\par RCA: 413\par \par Echocardiogram 9/30/2022:\par Overall normal LV size and function ejection fraction 60%\par Persistent small circumferential pericardial effusion unchanged from prior.\par \par Echocardiogram 10/27/2021:\par Normal LV size and function ejection fraction 60%\par Small circumferential pericardial effusion minimally changed in comparison to the prior study\par \par Echocardiogram 8/1/19: \par LVEF 60%.\par No valvular heart disease of significance\par Trivial pericardial effusion unchanged.\par \par Pharmacologic sestamibi 11/19/20:\par Normal sestamibi imaging and no evidence of ischemia.\par Unchanged from study of 2019\par \par Exercise sestamibi stress test 8/23/19:\par 12 minutes of exercise\par Heart rate one 5195%\par No ischemic ECG changes and normal sestamibi imaging.\par \par Echocardiogram 6/8/18:\par Normal LV size and function. LVEF 60%.\par Mild mitral valve polyps.\par Trivial pericardial effusion.\par Unchanged from prior study December 2016.\par \par 6/8/18:Carotid duplex\par No significant atherosclerotic disease seen bilaterally.\par \par Impression:\par 1. Coronary artery disease seems essentially unchanged in comparing the CTA of 2022 to that of 2017.\par      The increase calcium score is potentially due to a change in the composition of existing plaque.\par      Notably FFR confirms that there is no hemodynamically significant stenosis.\par 2. Stress testing  2020  showing no ischemia \par 3. HLD: Stable with good control.\par 4. No sig carotid disease\par 5.  Idiopathic pericardial effusion trivial to small with minimal interval change.\par 6.  Left lower extremity pain less likely statin related\par \par Plan:\par 1. Will continue simvastatin 40 mg daily,\par 2. blood work in 6 months.  Through PMD\par 3. Repeat echo 1 year\par 4. Repeat nuclear stress 1 year\par 5.  Maintain regular exercise regimen\par 6.  Contact me with regard to any new exertional shortness of breath or chest discomfort\par F/U 6 months

## 2022-11-15 ENCOUNTER — APPOINTMENT (OUTPATIENT)
Dept: CARDIOLOGY | Facility: CLINIC | Age: 62
End: 2022-11-15

## 2022-11-15 PROCEDURE — 93880 EXTRACRANIAL BILAT STUDY: CPT

## 2022-12-01 ENCOUNTER — NON-APPOINTMENT (OUTPATIENT)
Age: 62
End: 2022-12-01

## 2023-04-24 ENCOUNTER — APPOINTMENT (OUTPATIENT)
Dept: CARDIOLOGY | Facility: CLINIC | Age: 63
End: 2023-04-24

## 2023-06-06 ENCOUNTER — APPOINTMENT (OUTPATIENT)
Dept: CARDIOLOGY | Facility: CLINIC | Age: 63
End: 2023-06-06

## 2023-09-14 ENCOUNTER — APPOINTMENT (OUTPATIENT)
Dept: CARDIOLOGY | Facility: CLINIC | Age: 63
End: 2023-09-14
Payer: COMMERCIAL

## 2023-09-14 PROCEDURE — 93306 TTE W/DOPPLER COMPLETE: CPT

## 2023-09-15 ENCOUNTER — APPOINTMENT (OUTPATIENT)
Dept: CARDIOLOGY | Facility: CLINIC | Age: 63
End: 2023-09-15

## 2023-10-13 ENCOUNTER — NON-APPOINTMENT (OUTPATIENT)
Age: 63
End: 2023-10-13

## 2023-10-13 ENCOUNTER — APPOINTMENT (OUTPATIENT)
Dept: CARDIOLOGY | Facility: CLINIC | Age: 63
End: 2023-10-13
Payer: COMMERCIAL

## 2023-10-13 VITALS
WEIGHT: 122 LBS | BODY MASS INDEX: 20.83 KG/M2 | HEART RATE: 79 BPM | DIASTOLIC BLOOD PRESSURE: 84 MMHG | SYSTOLIC BLOOD PRESSURE: 130 MMHG | RESPIRATION RATE: 12 BRPM | HEIGHT: 64 IN

## 2023-10-13 PROCEDURE — 99214 OFFICE O/P EST MOD 30 MIN: CPT | Mod: 25

## 2023-10-13 PROCEDURE — 93000 ELECTROCARDIOGRAM COMPLETE: CPT

## 2023-10-13 RX ORDER — L.ACID,FERM,PLA,RHA/B.BIF,LONG 126 MG
TABLET, DELAYED AND EXTENDED RELEASE ORAL
Refills: 0 | Status: DISCONTINUED | COMMUNITY
End: 2023-10-13

## 2023-11-14 ENCOUNTER — RX RENEWAL (OUTPATIENT)
Age: 63
End: 2023-11-14

## 2024-04-03 ENCOUNTER — NON-APPOINTMENT (OUTPATIENT)
Age: 64
End: 2024-04-03

## 2024-04-12 ENCOUNTER — APPOINTMENT (OUTPATIENT)
Dept: CARDIOLOGY | Facility: CLINIC | Age: 64
End: 2024-04-12

## 2024-04-24 ENCOUNTER — APPOINTMENT (OUTPATIENT)
Dept: ORTHOPEDIC SURGERY | Facility: CLINIC | Age: 64
End: 2024-04-24

## 2024-04-25 RX ORDER — SIMVASTATIN 40 MG/1
40 TABLET, FILM COATED ORAL
Qty: 90 | Refills: 1 | Status: ACTIVE | COMMUNITY
Start: 2018-04-27 | End: 1900-01-01

## 2024-04-30 ENCOUNTER — NON-APPOINTMENT (OUTPATIENT)
Age: 64
End: 2024-04-30

## 2024-05-01 ENCOUNTER — APPOINTMENT (OUTPATIENT)
Dept: ORTHOPEDIC SURGERY | Facility: CLINIC | Age: 64
End: 2024-05-01

## 2024-05-02 ENCOUNTER — APPOINTMENT (OUTPATIENT)
Dept: CARDIOLOGY | Facility: CLINIC | Age: 64
End: 2024-05-02
Payer: COMMERCIAL

## 2024-05-02 VITALS
BODY MASS INDEX: 20.83 KG/M2 | WEIGHT: 122 LBS | DIASTOLIC BLOOD PRESSURE: 80 MMHG | RESPIRATION RATE: 16 BRPM | HEART RATE: 62 BPM | HEIGHT: 64 IN | SYSTOLIC BLOOD PRESSURE: 126 MMHG

## 2024-05-02 DIAGNOSIS — I25.10 ATHEROSCLEROTIC HEART DISEASE OF NATIVE CORONARY ARTERY W/OUT ANGINA PECTORIS: ICD-10-CM

## 2024-05-02 DIAGNOSIS — I31.39 OTHER PERICARDIAL EFFUSION (NONINFLAMMATORY): ICD-10-CM

## 2024-05-02 DIAGNOSIS — E78.2 MIXED HYPERLIPIDEMIA: ICD-10-CM

## 2024-05-02 PROCEDURE — 99214 OFFICE O/P EST MOD 30 MIN: CPT

## 2024-05-02 PROCEDURE — G2211 COMPLEX E/M VISIT ADD ON: CPT

## 2024-05-02 RX ORDER — EZETIMIBE 10 MG/1
10 TABLET ORAL
Qty: 30 | Refills: 0 | Status: ACTIVE | COMMUNITY
Start: 2024-05-02 | End: 1900-01-01

## 2024-05-02 NOTE — REASON FOR VISIT
[FreeTextEntry1] : NARA QUEEN is a 63 year-old F presents here for cardiac follow-up. She has a hx of:  - atherosclerotic coronary disease (abnormal CTA in 2017) - HLD - chronic pericardial effusion - family hx of premature CAD  - Former smoker, quit over 12 years ago

## 2024-05-02 NOTE — ASSESSMENT
[FreeTextEntry1] :   LABS: -----------------------------------------------------------------Sim 40 5x/wk-- Daily ---------1/12/18--- 8/3/18---4/12/19---8/25/20----6/17/21--10/20/21----7/3/22--4/16/24 Chol------152-------173-------154-------133---------160-------154------------121-----157 HDL------ 56---------89---------59------- 60------------64---------60-------------48------54 LDL--------72--------61---------78--------46------------80---------75-------------48------75  Coronary CTA 11/2023: Left circ: Mild 25-49% stenosis LAD: moderate stenosis 50-69% RCA: mild 25-49% stenosis Extracardiac findings: Moderate sized hiatal hernia and diaphragmatic defect  ECHOCARDIOGRAM 9/14/23: LV normal size and systolic function. LVEF 60-65% Trace MR Trace AI Trace TR Small pericardial effusion unchanged compared to prior   Coronary Calcium score 10/23/2022: Calcium score-856 Left main: 0 LAD: 416 Circumflex: 26 RCA: 413  Echocardiogram 9/30/2022: Overall normal LV size and function ejection fraction 60% Persistent small circumferential pericardial effusion unchanged from prior.  Echocardiogram 10/27/2021: Normal LV size and function ejection fraction 60% Small circumferential pericardial effusion minimally changed in comparison to the prior study  Echocardiogram 8/1/19: LVEF 60%. No valvular heart disease of significance Trivial pericardial effusion unchanged.  Pharmacologic sestamibi 11/19/20: Normal sestamibi imaging and no evidence of ischemia. Unchanged from study of 2019  Exercise sestamibi stress test 8/23/19: 12 minutes of exercise Heart rate one 5195% No ischemic ECG changes and normal sestamibi imaging.  Echocardiogram 6/8/18: Normal LV size and function. LVEF 60%. Mild mitral valve polyps. Trivial pericardial effusion.  Unchanged from prior study December 2016.  6/8/18:Carotid duplex No significant atherosclerotic disease seen bilaterally.  IMPRESSION: 1. Patient with known high coronary calcification primarily in the LAD territory. CTA showed moderate stenosis in the LAD. She has no anginal symptoms, now exercising most days. Declined EKG today. EKG from PMD reviewed, no EKG changes to suggest ischemia or prior infarct.  Symptoms of axilla discomfort and discomfort under left breast possibly musculoskeletal/inflammation/ consider possibly related to hiatal hernia.  2. Lipids reasonably controlled on Simvastatin but still not at goal.  3. Blood pressure WNL.  4. Echocardiogram shows normal LV function and stable small pericardial effusion.   PLAN: 1. Continue ASA and Simvastatin for asymptomatic CAD.  2.Add Zetia. Repeat lipids in 3 months. If lipids still elevated, consider PSK9i. Cost may be an issue.  3. Patient declining nuclear stress testing due to cost. If ischemic evaluation is indicated in the future, would consider exercise stress testing.  4. Pt advised to continue to exercise for at least 30 minutes most days of the week. Any cardiac symptoms such as chest pain, palpitations or new shortness of breath should be reported. 5. Continue to follow a heart healthy diet consisting of more vegetables, leans meats, whole grains, nuts and fruits. Avoid trans fats, saturated fats and processed meats. 6. Consider repeat echocardiogram to assess chronic pericardial effusion at the end of the year or early 2025.   Pt knows to call if any new or worsening symptoms. In the absence of any cardiac associated symptoms clinical follow up can be made in 6 months.

## 2024-05-02 NOTE — PHYSICAL EXAM

## 2024-05-02 NOTE — HISTORY OF PRESENT ILLNESS
[FreeTextEntry1] : Patient reports on 3/25/24 she woke up and had an "ache" to her left axilla that lasted 24 hours. Discomfort returned about a week later. Also had aching sensation under her left breast. Was seen by her PMD. EKG was done that showed no acute changes. She also had a mammogram done.   She continues to exercise most days of the week. Walks/jogs and goes on the treadmill several times a week. She denies any exertional symptoms. She denies any chest pain, dizziness, syncope, near-syncope, SOB, edema, orthopnea or PND.  Tolerating current medications. Other statins have been tried in the past but not well tolerated due to myalgias.

## 2024-10-08 NOTE — HISTORY OF PRESENT ILLNESS
[FreeTextEntry1] : Patient admits to being very anxious about her underlying medical issues. \par Much of this relates to her history of chronic Lyme disease.\par \par She had been on Livalo which was very effective in lowering her LDL, however, \par we stopped it bec of the monthly costs and switched to Simvastatin 40 mg which we switched from QOD tp 5 x weekly last visit\par \par Cardiac CTA June 2, 2017\par Heavy calcification and plaquing of the proximal LAD 50-70%\par 10-25% diagonal and obtuse marginal branch\par 20-40% RCA\par \par 7/27/18 nuclear stress test\par Exercise 10 minutes 30 seconds mora 12 Mets).\par Peak heart rate 146 (91% maximum).\par Blood pressure response normal\par No ischemic ECG changes.\par Normal myocardial perfusion 6

## 2024-11-25 ENCOUNTER — APPOINTMENT (OUTPATIENT)
Dept: CARDIOLOGY | Facility: CLINIC | Age: 64
End: 2024-11-25
Payer: COMMERCIAL

## 2024-11-25 VITALS
WEIGHT: 122 LBS | SYSTOLIC BLOOD PRESSURE: 122 MMHG | HEIGHT: 64 IN | BODY MASS INDEX: 20.83 KG/M2 | HEART RATE: 67 BPM | DIASTOLIC BLOOD PRESSURE: 80 MMHG | RESPIRATION RATE: 16 BRPM

## 2024-11-25 DIAGNOSIS — I25.10 ATHEROSCLEROTIC HEART DISEASE OF NATIVE CORONARY ARTERY W/OUT ANGINA PECTORIS: ICD-10-CM

## 2024-11-25 DIAGNOSIS — R07.89 OTHER CHEST PAIN: ICD-10-CM

## 2024-11-25 DIAGNOSIS — E78.2 MIXED HYPERLIPIDEMIA: ICD-10-CM

## 2024-11-25 PROCEDURE — G2211 COMPLEX E/M VISIT ADD ON: CPT | Mod: NC

## 2024-11-25 PROCEDURE — 99214 OFFICE O/P EST MOD 30 MIN: CPT

## 2024-11-25 PROCEDURE — 93000 ELECTROCARDIOGRAM COMPLETE: CPT

## 2024-11-25 RX ORDER — METOPROLOL TARTRATE 50 MG/1
50 TABLET ORAL
Qty: 20 | Refills: 0 | Status: ACTIVE | COMMUNITY
Start: 2024-11-25 | End: 1900-01-01

## 2025-02-20 ENCOUNTER — NON-APPOINTMENT (OUTPATIENT)
Age: 65
End: 2025-02-20

## 2025-07-12 ENCOUNTER — NON-APPOINTMENT (OUTPATIENT)
Age: 65
End: 2025-07-12

## 2025-07-14 ENCOUNTER — APPOINTMENT (OUTPATIENT)
Dept: CARDIOLOGY | Facility: CLINIC | Age: 65
End: 2025-07-14
Payer: COMMERCIAL

## 2025-07-14 VITALS
BODY MASS INDEX: 21 KG/M2 | RESPIRATION RATE: 16 BRPM | HEART RATE: 71 BPM | DIASTOLIC BLOOD PRESSURE: 80 MMHG | OXYGEN SATURATION: 95 % | SYSTOLIC BLOOD PRESSURE: 132 MMHG | WEIGHT: 123 LBS | HEIGHT: 64 IN

## 2025-07-14 PROCEDURE — 99214 OFFICE O/P EST MOD 30 MIN: CPT

## 2025-07-14 PROCEDURE — 93000 ELECTROCARDIOGRAM COMPLETE: CPT

## 2025-07-14 PROCEDURE — G2211 COMPLEX E/M VISIT ADD ON: CPT | Mod: NC

## 2025-09-12 ENCOUNTER — APPOINTMENT (OUTPATIENT)
Dept: CARDIOLOGY | Facility: CLINIC | Age: 65
End: 2025-09-12
Payer: COMMERCIAL

## 2025-09-12 ENCOUNTER — NON-APPOINTMENT (OUTPATIENT)
Age: 65
End: 2025-09-12

## 2025-09-12 PROCEDURE — 93015 CV STRESS TEST SUPVJ I&R: CPT

## 2025-09-12 PROCEDURE — 78452 HT MUSCLE IMAGE SPECT MULT: CPT

## 2025-09-12 PROCEDURE — A9500: CPT

## 2025-09-18 ENCOUNTER — NON-APPOINTMENT (OUTPATIENT)
Age: 65
End: 2025-09-18